# Patient Record
Sex: MALE | Race: OTHER | HISPANIC OR LATINO | Employment: STUDENT | URBAN - METROPOLITAN AREA
[De-identification: names, ages, dates, MRNs, and addresses within clinical notes are randomized per-mention and may not be internally consistent; named-entity substitution may affect disease eponyms.]

---
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
## 2022-06-12 PROCEDURE — 93010 EKG 12-LEAD: ICD-10-PCS | Mod: ,,, | Performed by: INTERNAL MEDICINE

## 2022-06-12 PROCEDURE — 93005 ELECTROCARDIOGRAM TRACING: CPT

## 2022-06-12 PROCEDURE — 93010 ELECTROCARDIOGRAM REPORT: CPT | Mod: ,,, | Performed by: INTERNAL MEDICINE

## 2022-06-12 PROCEDURE — 99285 EMERGENCY DEPT VISIT HI MDM: CPT | Mod: 25

## 2022-06-13 ENCOUNTER — HOSPITAL ENCOUNTER (EMERGENCY)
Facility: HOSPITAL | Age: 22
Discharge: HOME | End: 2022-06-13
Attending: EMERGENCY MEDICINE
Payer: MEDICAID

## 2022-06-13 VITALS
TEMPERATURE: 98 F | SYSTOLIC BLOOD PRESSURE: 117 MMHG | BODY MASS INDEX: 32.93 KG/M2 | HEIGHT: 70 IN | WEIGHT: 230 LBS | RESPIRATION RATE: 24 BRPM | HEART RATE: 66 BPM | OXYGEN SATURATION: 100 % | DIASTOLIC BLOOD PRESSURE: 66 MMHG

## 2022-06-13 DIAGNOSIS — R00.2 PALPITATIONS: ICD-10-CM

## 2022-06-13 DIAGNOSIS — R42 LIGHTHEADED: ICD-10-CM

## 2022-06-13 LAB
ALBUMIN SERPL BCP-MCNC: 4.3 G/DL (ref 3.5–5.2)
ALP SERPL-CCNC: 85 U/L (ref 55–135)
ALT SERPL W/O P-5'-P-CCNC: 32 U/L (ref 10–44)
ANION GAP SERPL CALC-SCNC: 12 MMOL/L (ref 8–16)
AST SERPL-CCNC: 20 U/L (ref 10–40)
BASOPHILS # BLD AUTO: 0.02 K/UL (ref 0–0.2)
BASOPHILS NFR BLD: 0.5 % (ref 0–1.9)
BILIRUB SERPL-MCNC: 0.9 MG/DL (ref 0.1–1)
BUN SERPL-MCNC: 12 MG/DL (ref 6–20)
CALCIUM SERPL-MCNC: 9.7 MG/DL (ref 8.7–10.5)
CHLORIDE SERPL-SCNC: 101 MMOL/L (ref 95–110)
CO2 SERPL-SCNC: 28 MMOL/L (ref 23–29)
CREAT SERPL-MCNC: 0.9 MG/DL (ref 0.5–1.4)
DIFFERENTIAL METHOD: NORMAL
EOSINOPHIL # BLD AUTO: 0 K/UL (ref 0–0.5)
EOSINOPHIL NFR BLD: 1 % (ref 0–8)
ERYTHROCYTE [DISTWIDTH] IN BLOOD BY AUTOMATED COUNT: 12.3 % (ref 11.5–14.5)
EST. GFR  (AFRICAN AMERICAN): >60 ML/MIN/1.73 M^2
EST. GFR  (NON AFRICAN AMERICAN): >60 ML/MIN/1.73 M^2
GLUCOSE SERPL-MCNC: 94 MG/DL (ref 70–110)
HCT VFR BLD AUTO: 48.2 % (ref 40–54)
HGB BLD-MCNC: 17.1 G/DL (ref 14–18)
IMM GRANULOCYTES # BLD AUTO: 0.01 K/UL (ref 0–0.04)
IMM GRANULOCYTES NFR BLD AUTO: 0.2 % (ref 0–0.5)
LYMPHOCYTES # BLD AUTO: 1.9 K/UL (ref 1–4.8)
LYMPHOCYTES NFR BLD: 46.1 % (ref 18–48)
MCH RBC QN AUTO: 28.9 PG (ref 27–31)
MCHC RBC AUTO-ENTMCNC: 35.5 G/DL (ref 32–36)
MCV RBC AUTO: 82 FL (ref 82–98)
MONOCYTES # BLD AUTO: 0.4 K/UL (ref 0.3–1)
MONOCYTES NFR BLD: 9 % (ref 4–15)
NEUTROPHILS # BLD AUTO: 1.8 K/UL (ref 1.8–7.7)
NEUTROPHILS NFR BLD: 43.2 % (ref 38–73)
NRBC BLD-RTO: 0 /100 WBC
PLATELET # BLD AUTO: 173 K/UL (ref 150–450)
PMV BLD AUTO: 9.6 FL (ref 9.2–12.9)
POTASSIUM SERPL-SCNC: 3.4 MMOL/L (ref 3.5–5.1)
PROT SERPL-MCNC: 7 G/DL (ref 6–8.4)
RBC # BLD AUTO: 5.91 M/UL (ref 4.6–6.2)
SODIUM SERPL-SCNC: 141 MMOL/L (ref 136–145)
WBC # BLD AUTO: 4.1 K/UL (ref 3.9–12.7)

## 2022-06-13 PROCEDURE — 80053 COMPREHEN METABOLIC PANEL: CPT | Performed by: EMERGENCY MEDICINE

## 2022-06-13 PROCEDURE — 85025 COMPLETE CBC W/AUTO DIFF WBC: CPT | Performed by: EMERGENCY MEDICINE

## 2022-06-13 NOTE — ED NOTES
Patient denies any use of excessive caffeine or energy drinks. Does report recreational smoking of marijuana.

## 2022-06-13 NOTE — DISCHARGE INSTRUCTIONS
Please follow up with your primary care doctor. Please return if your symptoms return or if you feel worse in any way

## 2022-06-13 NOTE — ED PROVIDER NOTES
"Encounter Date: 6/12/2022    SCRIBE #1 NOTE: I, Jesse Garcia, am scribing for, and in the presence of,  Reinier Norton MD. I have scribed the following portions of the note - Other sections scribed: HPI, ROS, PE.       History     Chief Complaint   Patient presents with    Tachycardia     Patient presents to the ED with reports of having intermittent episodes of tachycardia with lightheadedness and chest "sore dull feeling". Patient states episodes have been occurring intermittently over the past week.     Shay Henry is a 21 y.o. male who presents to the ED for evaluation of tachycardia, onset 2 days ago.  Patient notes that he recently returned from a long car trip out of town.  Patient denies any history of similar symptoms. Patient notes that he has began smoking marijuana 2 years ago, which sometimes gives him a sensation as if he is about to  "have a heart attack".  Patient states that these marijuana induced episodes began within the past 3 weeks and resolve within 1 or 2 hours.   Patient reports chest pain that he describes as "soreness".  Patient notes nausea, though he notes this may be from the long car ride.  Patient also notes headache, light headedness, dizziness,  worsening vision over several months, mild difficulty sleeping, and mild shortness of breath.  Patient denies any recent specific stressors.  Patient denies regularly using any medications.  Patient denies any medical problems.  Patient denies any family history of heart attacks.  Patient denies drinking caffeine.  Patient denies any family history of heart attacks.  Patient denies vomiting, diarrhea, or any other associated symptoms.    The history is provided by the patient. No  was used.     Review of patient's allergies indicates:  No Known Allergies  No past medical history on file.  No past surgical history on file.  No family history on file.  Social History     Tobacco Use    Smoking status: " Never Smoker    Smokeless tobacco: Never Used   Substance Use Topics    Alcohol use: No    Drug use: No     Review of Systems   Constitutional: Negative for fever.   HENT: Negative for sore throat.    Eyes: Positive for visual disturbance.   Respiratory: Positive for shortness of breath.    Cardiovascular: Positive for palpitations. Negative for chest pain.   Gastrointestinal: Negative for nausea.   Genitourinary: Negative for dysuria.   Musculoskeletal: Negative for back pain.   Skin: Negative for rash.   Neurological: Positive for dizziness, light-headedness and headaches. Negative for weakness.   Hematological: Does not bruise/bleed easily.   Psychiatric/Behavioral: Positive for sleep disturbance.       Physical Exam     Initial Vitals [06/12/22 2337]   BP Pulse Resp Temp SpO2   122/77 66 18 98.1 °F (36.7 °C) 99 %      MAP       --         Physical Exam    Nursing note and vitals reviewed.  Constitutional: He appears well-developed and well-nourished. He is not diaphoretic. No distress.   Anxious.   HENT:   Head: Normocephalic and atraumatic.   Mouth/Throat: Oropharynx is clear and moist.   Eyes: EOM are normal. Pupils are equal, round, and reactive to light.   Neck: No tracheal deviation present.   Cardiovascular: Normal rate, regular rhythm, normal heart sounds and intact distal pulses.   Pulmonary/Chest: Breath sounds normal. No stridor. No respiratory distress.   Abdominal: Abdomen is soft. He exhibits no distension and no mass. There is no abdominal tenderness.   Musculoskeletal:         General: No edema. Normal range of motion.     Neurological: He is alert and oriented to person, place, and time. No cranial nerve deficit or sensory deficit.   CN 2-12 intact  Finger to nose within normal limits  Gait normal  Equal strength to bilateral upper extremities, SILT  Equal strength to bilateral lower extremities, SILT     Skin: Skin is warm and dry. Capillary refill takes less than 2 seconds. No rash noted.    Psychiatric:   Somewhat anxious          ED Course   Procedures  Labs Reviewed   COMPREHENSIVE METABOLIC PANEL - Abnormal; Notable for the following components:       Result Value    Potassium 3.4 (*)     All other components within normal limits   CBC W/ AUTO DIFFERENTIAL          Imaging Results          X-Ray Chest 1 View (In process)  Result time 06/13/22 02:33:35                 Medications - No data to display  Medical Decision Making:   Differential Diagnosis:   Differential diagnosis includes but is not limited to:  Arrhythmia, electrolyte abnormality, thyroid disorder, medication effect, ACS    ED Management:  Easant 21-year-old with intermittent palpitations headache dizziness shortness of breath.  .  Patient has no focal neuro deficits to suggest CVA his lungs are clear I do not suspect pneumonia.  No signs/symptoms to suggest hypo/hyperthyroidism.  His EKG is without ischemic change and he was observed on cardiac monitoring without dysrhythmia.  He is PERC negative.  No cardiac risk factors or findings to suggest ACS today.  Suspect component of anxiety with marijuana use.  On reassessment patient is well-appearing and reports no complaints.  No emergent process has been identified today he appears stable for discharge.  Recommended close with his PCP who he sees for regular health care.  Discussed cessation of marijuana.  Return precautions for worsening/recurrent symptoms or any other concerns.    After taking into careful account the historical factors and physical exam findings of the patient's presentation today, in conjunction with the empirical and objective data obtained on ED workup, no acute emergent medical condition has been identified. The patient appears to be low risk for an emergent medical condition and I feel it is safe and appropriate at this time for the patient to be discharged to follow-up as detailed in their discharge instructions for reevaluation and possible continued  outpatient workup and management. I have discussed the specifics of the workup with the patient and the patient has verbalized understanding of the details of the workup, the diagnosis, the treatment plan, and the need for outpatient follow-up.  Although the patient has no emergent etiology today this does not preclude the development of an emergent condition so in addition, I have advised the patient that they can return to the ED and/or activate EMS at any time with worsening of their symptoms, change of their symptoms, or with any other medical complaint.  The patient remained comfortable and stable during their visit in the ED.  Discharge and follow-up instructions discussed with the patient who expressed understanding and willingness to comply with my recommendations.      Additional MDM:   PERC Rule:   Age is greater than or equal to 50 = 0.0  Heart Rate is greater than or equal to 100 = 0.0  SaO2 on room air < 95% = 0.0  Unilateral leg swelling = 0.0  Hemoptysis = 0.0  Recent surgery or trauma = 0.0  Prior PE or DVT =  0.0  Hormone use = 0.00  PERC Score = 0       Scribe Attestation:   Scribe #1: I performed the above scribed service and the documentation accurately describes the services I performed. I attest to the accuracy of the note.        ED Course as of 06/13/22 0504   Mon Jun 13, 2022   0229 EKG:  Rate 63. Normal sinus rhythm.  No STEMI.  No ectopy.   [RN]   0414 Chest x-ray without acute findings per my interpretation [RN]      ED Course User Index  [RN] Reinier Norton Jr., MD             Physician Attestation for Scribe:  I,  Dr. Norton, reviewed documentation as scribed in my presence, and it is both accurate and complete.     Portions of this note were dictated using voice recognition software and may contain dictation related errors in spelling/grammar/syntax not found on text review      Clinical Impression:   Final diagnoses:  [R42] Lightheaded  [R00.2] Palpitations                 Reinier BLACK  Errol Rocha MD  06/13/22 0510

## 2022-06-13 NOTE — ED NOTES
"Pt c/o "fast heartbeat" x 4 days, the same day he used THC oil. Pt states he has had this before, denies excessive use of caffeine and denies cocaine use. Pt states he can feel light- headed when he gets these episodes. Pt states he is pain- free at this time. Pt A & O x 3, denies SOB, fever, chills, cough and N/V/D. Respirations are even and unlabored. Skin is warm, dry and pink. VS. SHERIDAN x 3mm. BBS- CTA. Abd- SNT. PSM x 4 exts. Pt is connected to the pulse ox, B/P cuff and EKG monitor. Bed is locked and in the low position w/ the side rails up and locked for pt safety. Call bell @ the BS. Will continue to monitor closely.  "

## 2022-11-05 ENCOUNTER — HOSPITAL ENCOUNTER (EMERGENCY)
Facility: HOSPITAL | Age: 22
Discharge: HOME | End: 2022-11-05
Attending: STUDENT IN AN ORGANIZED HEALTH CARE EDUCATION/TRAINING PROGRAM
Payer: MEDICAID

## 2022-11-05 VITALS
HEIGHT: 70 IN | BODY MASS INDEX: 31.5 KG/M2 | HEART RATE: 84 BPM | DIASTOLIC BLOOD PRESSURE: 69 MMHG | OXYGEN SATURATION: 98 % | SYSTOLIC BLOOD PRESSURE: 130 MMHG | WEIGHT: 220 LBS | TEMPERATURE: 98 F

## 2022-11-05 DIAGNOSIS — L03.90 CELLULITIS, UNSPECIFIED CELLULITIS SITE: Primary | ICD-10-CM

## 2022-11-05 PROCEDURE — 99283 EMERGENCY DEPT VISIT LOW MDM: CPT

## 2022-11-05 PROCEDURE — 25000003 PHARM REV CODE 250: Performed by: STUDENT IN AN ORGANIZED HEALTH CARE EDUCATION/TRAINING PROGRAM

## 2022-11-05 RX ORDER — DOXYCYCLINE 100 MG/1
100 CAPSULE ORAL 2 TIMES DAILY
Qty: 20 CAPSULE | Refills: 0 | Status: SHIPPED | OUTPATIENT
Start: 2022-11-05 | End: 2022-11-15

## 2022-11-05 RX ORDER — DOXYCYCLINE HYCLATE 100 MG
100 TABLET ORAL
Status: COMPLETED | OUTPATIENT
Start: 2022-11-05 | End: 2022-11-05

## 2022-11-05 RX ADMIN — DOXYCYCLINE HYCLATE 100 MG: 100 TABLET, COATED ORAL at 10:11

## 2022-11-06 NOTE — ED NOTES
Refer to triage not for HPI:    APPEARANCE: Alert, oriented and in no acute distress.  CARDIAC: Hypertensive. Normal rate. Pt denies chest pain.   PERIPHERAL VASCULAR: Normal cap refill. No edema. Warm to touch.    RESPIRATORY: Respirations are even and unlabored. Normal rate. Pt denies SOB. Symmetrical chest expansion bilaterally. No obvious signs of distress.  GASTRO: Abdomen soft, non-tender, no distention.  MUSC: Full ROM. No bony tenderness or soft tissue tenderness. No obvious deformity.  SKIN: Edema, erythema. Skin is warm and dry.  NEURO: [place] coma scale: eyes open spontaneously-4, oriented & converses-5, obeys commands-6. No neurological abnormalities.   MENTAL STATUS: Awake, alert and aware of environment.

## 2022-11-06 NOTE — DISCHARGE INSTRUCTIONS
Please take doxycycline as prescribed.  Keep an eye on the area over the next few days.  It may progress tomorrow but it should slowly start to recede.  Please return sooner for any increased fevers, pain, difficulty making a fist or any other concerns.  Follow-up with primary care physician within the next few days for recheck.

## 2022-11-06 NOTE — ED PROVIDER NOTES
Encounter Date: 11/5/2022       History     Chief Complaint   Patient presents with    Hand swelling     C/o left hand swelling and redness x 2 days. Reports swelling worsened today w/ increase in itching and erythema. Skin in tact. Redness and edema noted.     22-year-old male without past medical history presents with redness and swelling to the left hand.  He says he 1st noticed yesterday and progressively has worsened.  He says he is able to make a fist and he has no fevers, nausea, vomiting, diarrhea.  No chronic medical history.  He has no pain to the area.    Review of patient's allergies indicates:  No Known Allergies  No past medical history on file.  No past surgical history on file.  No family history on file.  Social History     Tobacco Use    Smoking status: Never    Smokeless tobacco: Never   Substance Use Topics    Alcohol use: No    Drug use: No     Review of Systems   All other systems reviewed and are negative.    Physical Exam     Initial Vitals [11/05/22 2242]   BP Pulse Resp Temp SpO2   130/69 84 -- 98.1 °F (36.7 °C) 98 %      MAP       --         Physical Exam    Nursing note and vitals reviewed.  Constitutional: He appears well-developed and well-nourished.   HENT:   Head: Normocephalic and atraumatic.   Eyes: EOM are normal. Pupils are equal, round, and reactive to light.   Neck: Neck supple. No JVD present.   Normal range of motion.  Cardiovascular:  Normal rate and regular rhythm.           Pulmonary/Chest: Breath sounds normal. No stridor. No respiratory distress.   Abdominal: Abdomen is soft. There is no abdominal tenderness.   Musculoskeletal:         General: No edema. Normal range of motion.      Cervical back: Normal range of motion and neck supple.      Comments: Left hand: No obvious deformity.  There is localized erythema and mild swelling to the dorsum of the palm.  Small pustular area with fluctuance with surrounding erythema distal to the redness. Does not involve the joints.   Full range of motion of the hand, he is able to open and close the fist without issue.  Neurovascular intact distally.  No bullae, crepitus, tenderness to the area or beyond it.  No areas of anesthesia.     Neurological: He is alert and oriented to person, place, and time. GCS score is 15. GCS eye subscore is 4. GCS verbal subscore is 5. GCS motor subscore is 6.   Skin: Skin is warm and dry. Capillary refill takes less than 2 seconds.   Psychiatric: He has a normal mood and affect. Thought content normal.       ED Course   Procedures  Labs Reviewed - No data to display       Imaging Results    None          Medications   doxycycline tablet 100 mg (has no administration in time range)     Medical Decision Making:   Initial Assessment:   As above.  Clinically suspect mild case of cellulitis due to the pustule identified.  Will treat with doxycycline.  Strict return precautions provided and symptoms to look out for were discussed at length.  All questions were answered education was provided.  The patient was reassessed and on subsequent re-evaluation, they were subjectively feeling better. They were resting comfortably and in no acute distress. I discussed the laboratory and diagnostic findings with the patient. Education was provided and all questions were answered. As discussed, they were recommended to follow up with their primary care physician within the next few days and to return to the emergency department sooner for any new or worsening. They acknowledged and verbalized agreement to the treatment plan. The patient was discharged home in stable condition.     DISCLAIMER: This note was prepared with [employer] voice recognition transcription software. Garbled syntax, mangled pronouns, and other bizarre constructions may be attributed to that software system.                            Clinical Impression:   Final diagnoses:  [L03.90] Cellulitis, unspecified cellulitis site (Primary)        ED Disposition Condition     Discharge Stable          ED Prescriptions       Medication Sig Dispense Start Date End Date Auth. Provider    doxycycline (VIBRAMYCIN) 100 MG Cap Take 1 capsule (100 mg total) by mouth 2 (two) times daily. for 10 days 20 capsule 11/5/2022 11/15/2022 Garrett Perdomo MD          Follow-up Information       Follow up With Specialties Details Why Contact Info    Catherine Butler MD Pediatrics Schedule an appointment as soon as possible for a visit  As needed [address]  [facility] [address]  [phone]               Garrett Perdomo MD  11/05/22 3946

## 2023-09-02 ENCOUNTER — OFFICE VISIT (OUTPATIENT)
Dept: URGENT CARE | Facility: CLINIC | Age: 23
End: 2023-09-02
Payer: COMMERCIAL

## 2023-09-02 VITALS
OXYGEN SATURATION: 98 % | BODY MASS INDEX: 31.5 KG/M2 | TEMPERATURE: 98 F | WEIGHT: 220 LBS | HEIGHT: 70 IN | SYSTOLIC BLOOD PRESSURE: 107 MMHG | HEART RATE: 62 BPM | DIASTOLIC BLOOD PRESSURE: 68 MMHG | RESPIRATION RATE: 19 BRPM

## 2023-09-02 DIAGNOSIS — N13.70 REFLUX, VESICOURETERAL: ICD-10-CM

## 2023-09-02 DIAGNOSIS — A08.4 VIRAL GASTROENTERITIS: Primary | ICD-10-CM

## 2023-09-02 PROCEDURE — 99213 OFFICE O/P EST LOW 20 MIN: CPT | Mod: S$GLB,,, | Performed by: NURSE PRACTITIONER

## 2023-09-02 PROCEDURE — 99213 PR OFFICE/OUTPT VISIT, EST, LEVL III, 20-29 MIN: ICD-10-PCS | Mod: S$GLB,,, | Performed by: NURSE PRACTITIONER

## 2023-09-02 RX ORDER — OMEPRAZOLE 20 MG/1
20 CAPSULE, DELAYED RELEASE ORAL DAILY
Qty: 30 CAPSULE | Refills: 0 | Status: SHIPPED | OUTPATIENT
Start: 2023-09-02 | End: 2024-09-01

## 2023-09-02 RX ORDER — OMEPRAZOLE 20 MG/1
20 CAPSULE, DELAYED RELEASE ORAL DAILY
Qty: 30 CAPSULE | Refills: 0 | Status: SHIPPED | OUTPATIENT
Start: 2023-09-02 | End: 2023-09-02

## 2023-09-02 NOTE — PROGRESS NOTES
"Subjective:      Patient ID: Mayank Henry is a 23 y.o. male.    Vitals:  height is 5' 10" (1.778 m) and weight is 99.8 kg (220 lb). His oral temperature is 98 °F (36.7 °C). His blood pressure is 107/68 and his pulse is 62. His respiration is 19 and oxygen saturation is 98%.     Chief Complaint: Abdominal Pain    Pt present to clinic with abdominal pain intermittent for 3 days. States today abd pain has resolved without intervention. Has also been experiencing hurt burn, a fullness mid sternal and belching. Last BM was this morning and is was watery. Denies any fever, chills, dysuria.   04/10    Abdominal Pain  This is a new problem. The current episode started yesterday. The onset quality is gradual. The problem occurs constantly. The problem has been gradually worsening. The pain is located in the right flank. The pain is at a severity of 4/10. The quality of the pain is cramping. The abdominal pain does not radiate. Associated symptoms include diarrhea, nausea and vomiting. Pertinent negatives include no fever or headaches. The pain is aggravated by movement. The pain is relieved by Nothing. Treatments tried: OTC. The treatment provided no relief. There is no history of abdominal surgery. Patient's medical history does not include kidney stones and UTI.       Constitution: Negative for fever.   Gastrointestinal:  Positive for abdominal pain, nausea, vomiting and diarrhea. Negative for history of abdominal surgery.   Neurological:  Negative for headaches.      Objective:     Physical Exam   Cardiovascular: Normal rate and regular rhythm.   Pulmonary/Chest: Effort normal and breath sounds normal.   Abdominal: Normal appearance and bowel sounds are normal. He exhibits no distension. Soft. There is no abdominal tenderness. There is no guarding, no left CVA tenderness and no right CVA tenderness. No hernia.   Neurological: He is alert.   Psychiatric: His behavior is normal.       Assessment:     1. Viral " gastroenteritis    2. Reflux, vesicoureteral        Plan:       Viral gastroenteritis  -     POCT Urinalysis, Dipstick, Automated, W/O Scope    Reflux, vesicoureteral  -     omeprazole (PRILOSEC) 20 MG capsule; Take 1 capsule (20 mg total) by mouth once daily.  Dispense: 30 capsule; Refill: 0

## 2024-11-21 ENCOUNTER — APPOINTMENT (OUTPATIENT)
Dept: LAB | Facility: HOSPITAL | Age: 24
End: 2024-11-21

## 2024-11-21 ENCOUNTER — OFFICE VISIT (OUTPATIENT)
Dept: INTERNAL MEDICINE | Facility: CLINIC | Age: 24
End: 2024-11-21

## 2024-11-21 VITALS
WEIGHT: 181.19 LBS | DIASTOLIC BLOOD PRESSURE: 62 MMHG | SYSTOLIC BLOOD PRESSURE: 100 MMHG | HEIGHT: 70 IN | RESPIRATION RATE: 18 BRPM | OXYGEN SATURATION: 100 % | HEART RATE: 62 BPM | TEMPERATURE: 98 F | BODY MASS INDEX: 25.94 KG/M2

## 2024-11-21 DIAGNOSIS — K52.9 CHRONIC DIARRHEA: ICD-10-CM

## 2024-11-21 DIAGNOSIS — R10.9 GASTRIC PAIN: Primary | ICD-10-CM

## 2024-11-21 LAB — H. PYLORI STOOL: NEGATIVE

## 2024-11-21 PROCEDURE — 99214 OFFICE O/P EST MOD 30 MIN: CPT | Mod: PBBFAC

## 2024-11-21 PROCEDURE — 87338 HPYLORI STOOL AG IA: CPT

## 2024-11-21 RX ORDER — OMEPRAZOLE 20 MG/1
20 CAPSULE, DELAYED RELEASE ORAL DAILY
Qty: 60 CAPSULE | Refills: 0 | Status: SHIPPED | OUTPATIENT
Start: 2024-11-21 | End: 2025-01-20

## 2024-11-21 NOTE — PROGRESS NOTES
[facility] INTERNAL MEDICINE  OUTPATIENT OFFICE VISIT NOTE    SUBJECTIVE:      Chief Complaint: Establish Care       HPI: Mayank Henry is a 24 y.o. yo male w/ PMH of  has no past medical history on file., who presents for  establishing care    Seen in the ED two times this year for GI problems. He had severe abdominal pain with some nausea and diarrhea, which prevented him from going to class. He reports that prior to moving to [place] for [employer], he had been doing well prior to moving here. His diet has changed from home cooked meals to solely cafeteria food, which is a lot heavier than what he is used to. He does report that his severe abdominal pain is relieved with food consumption, and that the pain is the worst in the morning. He does have multiple bowel movements, occasionally with incomplete emptying, which does improve his pain but still persists. Denies any nocturnal awakenings for bowel movements and does not have any melena/hematochezia. Has attempted imodium and ferny seltzer, which did not improve diarrhea or pain. No recent antibiotics. He was seen in the ED and was given omeprazole, which has helped with his pain. His diarrheal issues returned for the past 2 weeks with 3-4 watery bowel movements every day.     Denies fevers/chills, chest pain, shortness of breath, vomiting.    Past Medical History:   has no past medical history on file.     Past Surgical History:   has no past surgical history on file.     Family History:  family history includes Heart disease in his mother; No Known Problems in his father, sister, and sister.     Social History:   reports that he has never smoked. He has never used smokeless tobacco. He reports current drug use. Frequency: 4.00 times per week. Drug: Marijuana. He reports that he does not drink alcohol.     Allergies:  has No Known Allergies.     Home Medications:  Prior to Admission medications    Medication Sig Start Date End Date Taking? Authorizing Provider  "  omeprazole (PRILOSEC) 20 MG capsule Take 1 capsule (20 mg total) by mouth once daily. 11/21/24 1/20/25  Callum Martin,    omeprazole (PRILOSEC) 20 MG capsule Take 1 capsule (20 mg total) by mouth once daily. 9/2/23 11/21/24  Angela Farris, NP       ROS:  Review of Systems   Constitutional:  Positive for weight loss. Negative for fever.   Gastrointestinal:  Positive for abdominal pain, diarrhea and nausea. Negative for constipation, melena and vomiting.   Genitourinary:  Negative for dysuria, frequency and hematuria.   Musculoskeletal:  Negative for myalgias.   Neurological:  Positive for headaches.           OBJECTIVE:     Vital signs:   /62 (BP Location: Left arm, Patient Position: Sitting)   Pulse 62   Temp 97.9 °F (36.6 °C) (Oral)   Resp 18   Ht 5' 10" (1.778 m)   Wt 82.2 kg (181 lb 3.2 oz)   SpO2 100%   BMI 26.00 kg/m²      Physical Examination:  General: Patient resting comfortably in chair, in no acute distress   Eye: PERRLA, EOMI, clear conjunctiva, eyelids normal  HENT: Head-normocephalic and atraumatic  Neck: full range of motion, no thyromegaly or lymphadenopathy, trachea midline, supple, no palpable thyroid nodules  Respiratory: clear to auscultation bilaterally without wheezes, rales, rhonchi  Cardiovascular: regular rate and rhythm without murmurs.  No gallops or rubs no JVD.  Capillary refill within normal limits.  Gastrointestinal: soft. Normal bowel sounds. Tender to palpation in RUQ and LUQ.   Genitourinary: no CVA tenderness to palpation  Musculoskeletal: full range of motion of all extremities/spine without limitation or discomfort  Integumentary: no rashes or skin lesions present  Neurologic: no signs of peripheral neurological deficit, motor/sensory function intact  Psychiatric:  alert and oriented, cognitive function intact, cooperative with exam, good eye contact, judgement and insight intact, mood and affect full range.     Labs:  CMP:   Lab Results   Component Value " Date    CALCIUM 9.3 02/07/2024    ALBUMIN 4.6 02/07/2024    PROT 7.0 06/13/2022     02/07/2024    K 3.8 02/07/2024    CO2 29 02/07/2024     06/13/2022    BUN 13 02/07/2024    CREATININE 0.77 02/07/2024    ALKPHOS 56 02/07/2024    ALT 18 02/07/2024    AST 19 02/07/2024    BILITOT 0.9 02/07/2024      CBC:   Lab Results   Component Value Date    WBC 4.10 06/13/2022    HGB 17.1 06/13/2022    HCT 48.2 06/13/2022    MCV 82 06/13/2022    RDW 12.3 06/13/2022       ASSESSMENT & PLAN:     Chronic watery diarrhea  ?gastroenteritis, functional diarrhea  -has had watery diarrhea 3-4x/day for the past two weeks, not resolving  -also has severe abdominal pain that prevents him from going to class  -no nocturnal awakenings, no melena/hematochezia, did not improve with imodium  -Obtain H. Pylori fecal antigen, after that, start omeprazole            Health Maintenance  Vaccinations  There is no immunization history for the selected administration types on file for this patient.    -Flu: will address at next visit    -Pneumonia:  address at 67      -Shingles:  address at 52   -Tdap:  given 5/2024   -COVID: will address at next visit   Screening   -Osteoporosis:  address at 67     -Lung Ca. Screening:  currently non-smoker   -Colon Ca. Screening:  address 47   -Hep C, HIV: will address at next visit  Social   -EtOH:  reports no history of alcohol use.   -Tobacco:  reports that he has never smoked. He has never used smokeless tobacco.   -Drugs:  reports current drug use. Frequency: 4.00 times per week. Drug: Marijuana.    -Depression:   Depression: Low Risk  (11/21/2024)    Depression     Last PHQ-4: Flowsheet Data: 0        Return to clinic in 3 month(s).    Callum Martin DO  \[facility]\"" Internal Medicine, PGY-III      Answers submitted by the patient for this visit:  Abdominal Pain Questionnaire (Submitted on 11/20/2024)  Chief Complaint: Abdominal pain  Chronicity: new  Onset: 1 to 4 weeks ago  Onset quality: sudden  Frequency: 2  to 4 times per day  Episode duration: 0.5 Hours  Progression since onset: waxing and waning  Pain location: generalized abdominal region  Pain - numeric: 6/10  Pain quality: aching, cramping, dull  Radiates to: does not radiate  anorexia: Yes  arthralgias: No  belching: Yes  flatus: No  hematochezia: No  Aggravated by: certain positions  Relieved by: activity, being still, belching, bowel movements, eating, passing flatus, recumbency, standing  Pain severity: moderate  Treatments tried: antacids  Improvement on treatment: mild  abdominal surgery: No  colon cancer: No  Crohn's disease: No  GERD: Yes  irritable bowel syndrome: No  kidney stones: No  pancreatitis: No  PUD: No  ulcerative colitis: No  UTI: No

## 2024-11-21 NOTE — LETTER
November 21, 2024    Mayank Henry  [address] [place] [address]             [facility]  Internal Medicine  [facility] [facility] [address]  Phone: [phone]   November 21, 2024     Patient: Mayank Henry   YOB: 2000   Date of Visit: 11/21/2024       To Whom it May Concern:    Mayank Henry was seen in my clinic on 11/21/2024. He may return to school on 11/25/24 . Additionally, he has had medical issues multiple days these past two weeks, for which he should be excused from classes during this time period    Please excuse him from any classes or work missed.    If you have any questions or concerns, please don't hesitate to call.    Sincerely,         Callum Martin, DO

## 2024-11-22 ENCOUNTER — TELEPHONE (OUTPATIENT)
Dept: INTERNAL MEDICINE | Facility: CLINIC | Age: 24
End: 2024-11-22

## 2024-11-22 NOTE — TELEPHONE ENCOUNTER
ANESTHESIA INTUBATION  Date/Time: 9/23/2018 10:20 AM  Urgency: elective      General Information and Staff    Patient location during procedure: OR  Anesthesiologist: Stephane Marlow MD  Performed: anesthesiologist     Indications and Patient Condition  In Contacted patient ID and  verified. Patient informed of negative H. pylori results. Patient verbalized complete understanding.

## 2024-11-22 NOTE — TELEPHONE ENCOUNTER
----- Message from Callum Martin sent at 11/21/2024  4:23 PM CST -----  Please let Mr. Henry know that his helicobacter pylori test came back negative.    Thank you!    Callum  ----- Message -----  From: Lab, Background User  Sent: 11/21/2024  11:16 AM CST  To: Callum Martin, DO